# Patient Record
Sex: FEMALE | Race: WHITE | NOT HISPANIC OR LATINO | Employment: OTHER | URBAN - METROPOLITAN AREA
[De-identification: names, ages, dates, MRNs, and addresses within clinical notes are randomized per-mention and may not be internally consistent; named-entity substitution may affect disease eponyms.]

---

## 2021-01-11 ENCOUNTER — TELEPHONE (OUTPATIENT)
Dept: SCHEDULING | Facility: IMAGING CENTER | Age: 81
End: 2021-01-11

## 2021-01-12 ENCOUNTER — TELEMEDICINE (OUTPATIENT)
Dept: MEDICAL GROUP | Facility: PHYSICIAN GROUP | Age: 81
End: 2021-01-12
Payer: MEDICARE

## 2021-01-12 VITALS
BODY MASS INDEX: 24.84 KG/M2 | WEIGHT: 135 LBS | TEMPERATURE: 98.6 F | HEART RATE: 69 BPM | HEIGHT: 62 IN | OXYGEN SATURATION: 97 %

## 2021-01-12 DIAGNOSIS — I10 ESSENTIAL HYPERTENSION: ICD-10-CM

## 2021-01-12 DIAGNOSIS — Z23 NEED FOR VACCINATION: ICD-10-CM

## 2021-01-12 DIAGNOSIS — Z13.220 SCREENING FOR HYPERLIPIDEMIA: ICD-10-CM

## 2021-01-12 DIAGNOSIS — E78.2 MIXED HYPERLIPIDEMIA: ICD-10-CM

## 2021-01-12 DIAGNOSIS — K21.9 GASTROESOPHAGEAL REFLUX DISEASE WITHOUT ESOPHAGITIS: ICD-10-CM

## 2021-01-12 DIAGNOSIS — Z13.0 ENCOUNTER FOR SCREENING FOR HEMATOLOGIC DISORDER: ICD-10-CM

## 2021-01-12 PROBLEM — M21.6X2 PRONATION DEFORMITY OF BOTH FEET: Status: ACTIVE | Noted: 2017-12-15

## 2021-01-12 PROBLEM — M19.072 PRIMARY OSTEOARTHRITIS OF LEFT FOOT: Status: ACTIVE | Noted: 2017-12-15

## 2021-01-12 PROBLEM — M21.6X1 PRONATION DEFORMITY OF BOTH FEET: Status: ACTIVE | Noted: 2017-12-15

## 2021-01-12 PROBLEM — E78.5 HLD (HYPERLIPIDEMIA): Status: ACTIVE | Noted: 2020-11-20

## 2021-01-12 PROCEDURE — 99203 OFFICE O/P NEW LOW 30 MIN: CPT | Performed by: NURSE PRACTITIONER

## 2021-01-12 RX ORDER — HYDROCHLOROTHIAZIDE 12.5 MG/1
12.5 TABLET ORAL
COMMUNITY
End: 2021-01-12

## 2021-01-12 RX ORDER — ATORVASTATIN CALCIUM 20 MG/1
20 TABLET, FILM COATED ORAL DAILY
COMMUNITY
Start: 2020-11-20

## 2021-01-12 RX ORDER — AMLODIPINE BESYLATE 5 MG/1
5 TABLET ORAL DAILY
COMMUNITY

## 2021-01-12 RX ORDER — OMEPRAZOLE 20 MG/1
CAPSULE, DELAYED RELEASE ORAL
COMMUNITY
Start: 2020-09-14

## 2021-01-13 PROBLEM — M20.11 HALLUX VALGUS, ACQUIRED, BILATERAL: Status: ACTIVE | Noted: 2017-12-15

## 2021-01-13 PROBLEM — M20.12 HALLUX VALGUS, ACQUIRED, BILATERAL: Status: ACTIVE | Noted: 2017-12-15

## 2021-01-13 PROBLEM — R42 DIZZINESS AND GIDDINESS: Status: ACTIVE | Noted: 2017-07-19

## 2021-01-13 NOTE — PROGRESS NOTES
Virtual Visit: New Patient   This visit was conducted via Zoom using secure and encrypted videoconferencing technology. The patient was in a private location in the state of [place].    The patient's identity was confirmed and verbal consent was obtained for this virtual visit.    Subjective:     CC:   Chief Complaint   Patient presents with   • [facility] Care       Ariadna Fernandes is a 80 y.o. female presenting to establish care and to discuss the evaluation and management of:    Essential hypertension  Patient with chronic history of hypertension this is controlled with amlodipine 5 mg daily  Denies chest pain shortness of breath chest palpitations or diaphoresis or lightheadedness.  States blood pressure has been controlled.  Due to this being a virtual visit no blood pressure was obtained at this time  Patient will continue on amlodipine 5 mg daily    Gastroesophageal reflux disease without esophagitis  Patient currently taking her Prilosec delayed release capsules 20 mg daily  States that she started taking Prilosec 7 years ago after her  had passed from cancer  Denies any current issues related to GERD at this time    Mixed hyperlipidemia  Patient currently taking atorvastatin 20 mg tablets daily  Patient was last labs from 11/2020 showed a total cholesterol of 211 and LDL of 130.  These results were obtained through [facility] from [place].  Patient denies any side effects from atorvastatin at this time      ROS  See HPI  Constitutional: Negative for fever, chills and malaise/fatigue.   HENT: Negative for congestion.    Eyes: Negative for pain.   Respiratory: Negative for cough and shortness of breath.    Cardiovascular: Negative for leg swelling.   Gastrointestinal: Negative for nausea, vomiting, abdominal pain and diarrhea.   Genitourinary: Negative for dysuria and hematuria.   Skin: Negative for rash.   Neurological: Negative for dizziness, focal weakness and headaches.   Endo/Heme/Allergies: Does not  "bruise/bleed easily.   Psychiatric/Behavioral: Negative for depression.  The patient is not nervous/anxious.      No Known Allergies    Current medicines (including changes today)  Current Outpatient Medications   Medication Sig Dispense Refill   • atorvastatin (LIPITOR) 20 MG Tab Take 20 mg by mouth every day.     • omeprazole (PRILOSEC) 20 MG delayed-release capsule TAKE 1 CAPSULE BY MOUTH EVERY DAY     • amLODIPine (NORVASC) 5 MG Tab Take 5 mg by mouth every day.       No current facility-administered medications for this visit.        She  has no past medical history on file.  She  has no past surgical history on file.      History reviewed. No pertinent family history.  No family status information on file.            Objective:   Pulse 69   Temp 37 °C (98.6 °F) (Temporal)   Ht 1.562 m (5' 1.5\")   Wt 61.2 kg (135 lb)   SpO2 97%   BMI 25.09 kg/m²     Physical Exam:  Constitutional: Alert, no distress, well-groomed.  Skin: No rashes in visible areas.  Eye: Round. Conjunctiva clear, lids normal. No icterus.   ENMT: Lips pink without lesions, good dentition, moist mucous membranes. Phonation normal.  Neck: No masses, no thyromegaly. Moves freely without pain.  Respiratory: Unlabored respiratory effort, no cough or audible wheeze  Psych: Alert and oriented x3, normal affect and mood.       Assessment and Plan:   The following treatment plan was discussed:     1. Essential hypertension  - CBC WITH DIFFERENTIAL; Future  - Comp Metabolic Panel; Future  - Lipid Profile; Future  Patient will continue taking amlodipine    2. Gastroesophageal reflux disease without esophagitis  Patient will continue taking Prilosec for this    3. Mixed hyperlipidemia  - Lipid Profile; Future  Patient will continue taking atorvastatin    4. Encounter for screening for hematologic disorder  - CBC WITH DIFFERENTIAL; Future    5. Screening for hyperlipidemia  - Lipid Profile; Future      Follow-up: Return in about 6 months (around " 7/12/2021) for follow up in 6 months for lab review.

## 2021-01-13 NOTE — ASSESSMENT & PLAN NOTE
Patient with chronic history of hypertension this is controlled with amlodipine 5 mg daily  Denies chest pain shortness of breath chest palpitations or diaphoresis or lightheadedness.  States blood pressure has been controlled.  Due to this being a virtual visit no blood pressure was obtained at this time  Patient will continue on amlodipine 5 mg daily

## 2021-01-13 NOTE — ASSESSMENT & PLAN NOTE
Patient currently taking atorvastatin 20 mg tablets daily  Patient was last labs from 11/2020 showed a total cholesterol of 211 and LDL of 130.  These results were obtained through [employer] from [place].  Patient denies any side effects from atorvastatin at this time

## 2021-01-13 NOTE — ASSESSMENT & PLAN NOTE
Patient currently taking her Prilosec delayed release capsules 20 mg daily  States that she started taking Prilosec 7 years ago after her  had passed from cancer  Denies any current issues related to GERD at this time

## 2021-01-17 ENCOUNTER — IMMUNIZATION (OUTPATIENT)
Dept: FAMILY PLANNING/WOMEN'S HEALTH CLINIC | Facility: IMMUNIZATION CENTER | Age: 81
End: 2021-01-17
Attending: INTERNAL MEDICINE
Payer: MEDICARE

## 2021-01-17 DIAGNOSIS — Z23 NEED FOR VACCINATION: ICD-10-CM

## 2021-01-17 DIAGNOSIS — Z23 ENCOUNTER FOR VACCINATION: Primary | ICD-10-CM

## 2021-01-17 PROCEDURE — 0011A MODERNA SARS-COV-2 VACCINE: CPT

## 2021-01-17 PROCEDURE — 91301 MODERNA SARS-COV-2 VACCINE: CPT

## 2021-02-14 ENCOUNTER — IMMUNIZATION (OUTPATIENT)
Dept: FAMILY PLANNING/WOMEN'S HEALTH CLINIC | Facility: IMMUNIZATION CENTER | Age: 81
End: 2021-02-14
Attending: INTERNAL MEDICINE
Payer: MEDICARE

## 2021-02-14 DIAGNOSIS — Z23 ENCOUNTER FOR VACCINATION: Primary | ICD-10-CM

## 2021-02-14 PROCEDURE — 0012A MODERNA SARS-COV-2 VACCINE: CPT

## 2021-02-14 PROCEDURE — 91301 MODERNA SARS-COV-2 VACCINE: CPT

## 2023-01-05 ENCOUNTER — OFFICE VISIT (OUTPATIENT)
Dept: URGENT CARE | Facility: CLINIC | Age: 83
End: 2023-01-05
Payer: MEDICARE

## 2023-01-05 VITALS
BODY MASS INDEX: 25.11 KG/M2 | DIASTOLIC BLOOD PRESSURE: 70 MMHG | SYSTOLIC BLOOD PRESSURE: 130 MMHG | HEIGHT: 61 IN | HEART RATE: 88 BPM | TEMPERATURE: 98.8 F | WEIGHT: 133 LBS | OXYGEN SATURATION: 96 % | RESPIRATION RATE: 14 BRPM

## 2023-01-05 DIAGNOSIS — Z86.79 HISTORY OF HYPERTENSION: ICD-10-CM

## 2023-01-05 DIAGNOSIS — J06.9 VIRAL URI WITH COUGH: ICD-10-CM

## 2023-01-05 PROCEDURE — 99203 OFFICE O/P NEW LOW 30 MIN: CPT | Performed by: NURSE PRACTITIONER

## 2023-01-05 ASSESSMENT — ENCOUNTER SYMPTOMS
WHEEZING: 0
SHORTNESS OF BREATH: 0
FEVER: 0
COUGH: 1

## 2023-01-05 NOTE — PROGRESS NOTES
"Subjective     Ariadna Fernandes is a 82 y.o. female who presents with Cough (X 4 days, cough, congestion, sore throat, chest congestion.)            Cough  This is a new problem. Episode onset: Accompanied by her daughter. pt reports new onset of cold like symptoms that started 4 days ago. No fevers, no SOB or wheezing. Home COVID test yesterday was negative.no smoker. no hx of asthma. Associated symptoms include nasal congestion and postnasal drip. Pertinent negatives include no fever, shortness of breath or wheezing. She has tried nothing for the symptoms.     Review of Systems   Constitutional:  Negative for fever.   HENT:  Positive for congestion and postnasal drip.    Respiratory:  Positive for cough. Negative for shortness of breath and wheezing.    All other systems reviewed and are negative.       History reviewed. No pertinent past medical history. History reviewed. No pertinent surgical history.   Social History     Socioeconomic History    Marital status:      Spouse name: Not on file    Number of children: Not on file    Years of education: Not on file    Highest education level: Not on file   Occupational History    Not on file   Tobacco Use    Smoking status: Never    Smokeless tobacco: Never   Vaping Use    Vaping Use: Never used   Substance and Sexual Activity    Alcohol use: Not Currently    Drug use: Never    Sexual activity: Not on file   Other Topics Concern    Not on file   Social History Narrative    Not on file     Social Determinants of Health     Financial Resource Strain: Not on file   Food Insecurity: Not on file   Transportation Needs: Not on file   Physical Activity: Not on file   Stress: Not on file   Social Connections: Not on file   Intimate Partner Violence: Not on file   Housing Stability: Not on file         Objective     /70   Pulse 88   Temp 37.1 °C (98.8 °F) (Temporal)   Resp 14   Ht 1.549 m (5' 1\")   Wt 60.3 kg (133 lb)   SpO2 96%   BMI 25.13 kg/m²      Physical " Exam  Vitals and nursing note reviewed.   Constitutional:       Appearance: Normal appearance. She is normal weight.   HENT:      Head: Normocephalic and atraumatic.      Right Ear: Tympanic membrane and external ear normal.      Left Ear: Tympanic membrane and external ear normal.      Nose: Congestion present.      Mouth/Throat:      Mouth: Mucous membranes are moist.      Pharynx: Oropharynx is clear.   Eyes:      Extraocular Movements: Extraocular movements intact.      Pupils: Pupils are equal, round, and reactive to light.   Cardiovascular:      Rate and Rhythm: Normal rate and regular rhythm.      Heart sounds: Murmur heard.   Pulmonary:      Effort: Pulmonary effort is normal.      Breath sounds: Normal breath sounds.   Musculoskeletal:         General: Normal range of motion.      Cervical back: Normal range of motion.   Skin:     General: Skin is warm and dry.      Capillary Refill: Capillary refill takes less than 2 seconds.   Neurological:      General: No focal deficit present.      Mental Status: She is alert and oriented to person, place, and time. Mental status is at baseline.   Psychiatric:         Mood and Affect: Mood normal.         Speech: Speech normal.         Thought Content: Thought content normal.         Judgment: Judgment normal.                           Assessment & Plan        1. Viral URI with cough    2. History of hypertension    Discussed with patient symptoms are viral in nature, there is low concern for any respiratory infection currently. Antibiotics are not advised at this time.  OTC choriceden HBP fr cough syrup  OTC claritin daily for congestion  Advised rest and fluids  RTC in 3 days if not improving  Supportive care, differential diagnoses, and indications for immediate follow-up discussed with patient.    Pathogenesis of diagnosis discussed including typical length and natural progression.    Instructed to return to  or nearest emergency department if symptoms fail to  improve, for any change in condition, further concerns, or new concerning symptoms.  Patient states understanding of the plan of care and discharge instructions.

## 2023-11-29 ENCOUNTER — PATIENT MESSAGE (OUTPATIENT)
Dept: HEALTH INFORMATION MANAGEMENT | Facility: OTHER | Age: 83
End: 2023-11-29